# Patient Record
Sex: FEMALE | Race: WHITE | NOT HISPANIC OR LATINO
[De-identification: names, ages, dates, MRNs, and addresses within clinical notes are randomized per-mention and may not be internally consistent; named-entity substitution may affect disease eponyms.]

---
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
## 2023-01-10 ENCOUNTER — NON-APPOINTMENT (OUTPATIENT)
Age: 18
End: 2023-01-10

## 2023-01-10 ENCOUNTER — APPOINTMENT (OUTPATIENT)
Dept: DERMATOLOGY | Facility: CLINIC | Age: 18
End: 2023-01-10
Payer: COMMERCIAL

## 2023-01-10 DIAGNOSIS — L73.9 FOLLICULAR DISORDER, UNSPECIFIED: ICD-10-CM

## 2023-01-10 DIAGNOSIS — D22.9 MELANOCYTIC NEVI, UNSPECIFIED: ICD-10-CM

## 2023-01-10 PROCEDURE — 99203 OFFICE O/P NEW LOW 30 MIN: CPT

## 2023-01-10 RX ORDER — HYDROCORTISONE 25 MG/G
2.5 CREAM TOPICAL TWICE DAILY
Qty: 15 | Refills: 0 | Status: ACTIVE | COMMUNITY
Start: 2023-01-10 | End: 1900-01-01

## 2023-01-10 RX ORDER — CLINDAMYCIN PHOSPHATE 1 G/10ML
1 GEL TOPICAL TWICE DAILY
Qty: 1 | Refills: 3 | Status: ACTIVE | COMMUNITY
Start: 2023-01-10 | End: 1900-01-01

## 2023-01-10 NOTE — HISTORY OF PRESENT ILLNESS
[FreeTextEntry1] : Patient notes many moles for skin check. [de-identified] : Also with recurring irritation and scaling of the left upper eyelid, and rash of the buttocks.

## 2023-01-10 NOTE — ASSESSMENT
[FreeTextEntry1] : A complete skin examination was performed.  There is no evidence of skin cancer.  We discussed the importance of photoprotection, including the use of hats, protective clothing and sunscreens with an SPF of at least 30.  Sun avoidance was also discussed.  The ABCDE's of melanoma was discussed.  Regular skin exams recommended.\par \par Benign pigmented nevi - discussed at length.\par Annual skin exams encourage.\par \par Possible contact dermatitis - left upper eyelid.\par Possible from make-up including mascara.\par Discussed with patient.\par Hytone cream bid prn to iimprove, use only as necessary.\par If persistent or continues to recur, will consider patch testing, possibly over the summer when patient is home from school.\par \par Folliculitis of the buttocks.\par Discussed.\par Clindamycin gel bid prn.\par Dial cleanser.\par Keep region dry.\par Try witch hazel astringent daily.

## 2023-01-10 NOTE — PHYSICAL EXAM
[Alert] : alert [Oriented x 3] : ~L oriented x 3 [Well Nourished] : well nourished [Full Body Skin Exam Performed] : performed [FreeTextEntry3] : A full skin exam was performed including the scalp, face, neck, chest, abdomen, back, buttocks, upper extremities and lower extremities.  The patient declined examination of the breasts and genitalia.  \par The exam did show the following benign growths:\par Bath pigmented nevi - numerous, all small, regular and homogeneous - scalp, neck, trunk, exts.\par \par Erythematous patch with scale, left upper eyelid.\par \par Follicular erythematous papules of the buttocks.\par \par

## 2023-01-18 ENCOUNTER — APPOINTMENT (OUTPATIENT)
Dept: OBGYN | Facility: CLINIC | Age: 18
End: 2023-01-18
Payer: COMMERCIAL

## 2023-01-18 VITALS
WEIGHT: 151 LBS | SYSTOLIC BLOOD PRESSURE: 120 MMHG | BODY MASS INDEX: 27.79 KG/M2 | DIASTOLIC BLOOD PRESSURE: 80 MMHG | HEIGHT: 62 IN

## 2023-01-18 LAB — HCG UR QL: NEGATIVE

## 2023-01-18 PROCEDURE — 99213 OFFICE O/P EST LOW 20 MIN: CPT

## 2023-01-18 PROCEDURE — 81025 URINE PREGNANCY TEST: CPT

## 2023-01-18 RX ORDER — NORETHINDRONE ACETATE AND ETHINYL ESTRADIOL 1MG-20(24)
1-20 KIT ORAL
Qty: 1 | Refills: 0 | Status: DISCONTINUED | COMMUNITY
Start: 2022-06-27 | End: 2023-01-18

## 2023-01-18 NOTE — HISTORY OF PRESENT ILLNESS
[Currently Active] : currently active [Men] : men [Condoms] : Condoms [FreeTextEntry1] : 9/2022 [FreeTextEntry4] : s/p Gardasil vaccine

## 2023-01-18 NOTE — DISCUSSION/SUMMARY
[FreeTextEntry1] : 1) Rx changed to Tri-Sprintec as patient desires to have a menses\par \par rEturn to office in 6 months for surveillance

## 2023-02-03 ENCOUNTER — TRANSCRIPTION ENCOUNTER (OUTPATIENT)
Age: 18
End: 2023-02-03

## 2023-03-20 ENCOUNTER — NON-APPOINTMENT (OUTPATIENT)
Age: 18
End: 2023-03-20

## 2023-03-24 ENCOUNTER — APPOINTMENT (OUTPATIENT)
Dept: DERMATOLOGY | Facility: CLINIC | Age: 18
End: 2023-03-24
Payer: COMMERCIAL

## 2023-03-24 PROCEDURE — 95044 PATCH/APPLICATION TESTS: CPT

## 2023-03-24 NOTE — ASSESSMENT
[FreeTextEntry1] : Patient aware of patch testing protocol.\par f/u in 2-3 days for patch removal.  Keep patches dry.

## 2023-03-27 ENCOUNTER — APPOINTMENT (OUTPATIENT)
Dept: DERMATOLOGY | Facility: CLINIC | Age: 18
End: 2023-03-27
Payer: COMMERCIAL

## 2023-03-27 PROCEDURE — 99213 OFFICE O/P EST LOW 20 MIN: CPT

## 2023-03-27 NOTE — PHYSICAL EXAM
[FreeTextEntry3] : Patch Test Reading  #1\par \par Days after application:  3\par \par Total number of patch tests:  147\par \par Panel 1:  All negative\par Panel 2:  All negative\par Panel 3:  Mild positive to #21 (tocopherol)\par Panel 4:  Mild positive to #31 (quaternium-15)\par Panel 5:  Mild positive to #41 (Cinnamel)\par Panel 6:  All negative\par Panel 7:  1+ to #65 (Ethylenurea melamine formaldehyde mix)\par Panel 8:  All negative\par \par Panel 9:  All negative\par Panel 10:  All negative\par Panel 11:  All negative\par \par Panel C1:  All negative\par Panel C2:  1+ to #14 (Hydroabietyl alcohol)\par Panel C3:  All negative\par Panel C4:  1+ to #36 (Gallate mix)\par

## 2023-03-29 ENCOUNTER — APPOINTMENT (OUTPATIENT)
Dept: DERMATOLOGY | Facility: CLINIC | Age: 18
End: 2023-03-29
Payer: COMMERCIAL

## 2023-03-29 DIAGNOSIS — L23.9 ALLERGIC CONTACT DERMATITIS, UNSPECIFIED CAUSE: ICD-10-CM

## 2023-03-29 PROCEDURE — 99214 OFFICE O/P EST MOD 30 MIN: CPT

## 2023-03-29 NOTE — ASSESSMENT
[FreeTextEntry1] : Contact dermatitis, with eyelid dermatitis.\par Significant findings with fragrance (linalool and limonene), hydroabietyl alcohol, and possibly gallates.\par Discussed at great length.\par Info sheets printed and given to patient.\par She is to call if any concerns, otherwise, f/u if needed in 2-3 months.

## 2023-03-29 NOTE — PHYSICAL EXAM
[FreeTextEntry3] : Patch Test Reading  #2\par \par Days after application:  5\par \par Total number of patch tests:  147\par \par Panel 1:  All negative\par Panel 2:  All negative\par Panel 3:  All negative\par Panel 4:  All negative\par Panel 5:  1+ reaction to #48 (linalool) and #49 (limonene)\par Panel 6:  All negative\par Panel 7:  All negative\par Panel 8:  1+ to #79 (Cobalt)\par \par Panel 9:  All negative\par Panel 10:  All negative\par Panel 11:  All negative\par \par Panel C1:  All negative\par Panel C2:  1+ to #14 (hydroabietyl alcohol)\par Panel C3:  All  negative\par Panel C4:  1+ to #36 (Gallate mix)\par

## 2023-04-19 ENCOUNTER — NON-APPOINTMENT (OUTPATIENT)
Age: 18
End: 2023-04-19

## 2023-07-20 ENCOUNTER — RX RENEWAL (OUTPATIENT)
Age: 18
End: 2023-07-20

## 2023-07-25 ENCOUNTER — TRANSCRIPTION ENCOUNTER (OUTPATIENT)
Age: 18
End: 2023-07-25

## 2023-08-21 ENCOUNTER — APPOINTMENT (OUTPATIENT)
Dept: OBGYN | Facility: CLINIC | Age: 18
End: 2023-08-21

## 2023-08-23 ENCOUNTER — APPOINTMENT (OUTPATIENT)
Dept: OBGYN | Facility: CLINIC | Age: 18
End: 2023-08-23
Payer: COMMERCIAL

## 2023-08-23 VITALS
DIASTOLIC BLOOD PRESSURE: 66 MMHG | SYSTOLIC BLOOD PRESSURE: 114 MMHG | WEIGHT: 150 LBS | HEIGHT: 62 IN | BODY MASS INDEX: 27.6 KG/M2

## 2023-08-23 DIAGNOSIS — Z30.9 ENCOUNTER FOR CONTRACEPTIVE MANAGEMENT, UNSPECIFIED: ICD-10-CM

## 2023-08-23 DIAGNOSIS — Z11.3 ENCOUNTER FOR SCREENING FOR INFECTIONS WITH A PREDOMINANTLY SEXUAL MODE OF TRANSMISSION: ICD-10-CM

## 2023-08-23 DIAGNOSIS — Z01.419 ENCOUNTER FOR GYNECOLOGICAL EXAMINATION (GENERAL) (ROUTINE) W/OUT ABNORMAL FINDINGS: ICD-10-CM

## 2023-08-23 PROCEDURE — 99395 PREV VISIT EST AGE 18-39: CPT

## 2023-08-23 RX ORDER — FLUOXETINE HYDROCHLORIDE 20 MG/1
20 CAPSULE ORAL
Refills: 0 | Status: DISCONTINUED | COMMUNITY
End: 2023-08-23

## 2023-08-23 RX ORDER — NORGESTIMATE AND ETHINYL ESTRADIOL 7DAYSX3 28
0.18/0.215/0.25 KIT ORAL DAILY
Qty: 3 | Refills: 1 | Status: DISCONTINUED | COMMUNITY
Start: 2023-01-18 | End: 2023-08-23

## 2023-08-23 NOTE — HISTORY OF PRESENT ILLNESS
[TextBox_4] : Alis is a 19 y/o G0 who presents today for an annual exam with OCP refill. She denies any cotnraindications for continued usage and reports daily/timely OCP compliance.   She was switched from Junel to Tri-Sprintec due to concerns of no menses with her previous pilll.  Today she reports regular menses however reports increased mood lability/irritability.  She atttens [employer]. She was working at the [employer] this summer [FreeTextEntry1] : 8/23/23 [Currently Active] : currently active

## 2023-08-23 NOTE — PHYSICAL EXAM
[Appropriately responsive] : appropriately responsive [Alert] : alert [No Acute Distress] : no acute distress [No Lymphadenopathy] : no lymphadenopathy [Oriented x3] : oriented x3 [Examination Of The Breasts] : a normal appearance [No Discharge] : no discharge [No Masses] : no breast masses were palpable [Labia Majora] : normal [Labia Minora] : normal [Scant] : There was scant vaginal bleeding [Normal] : normal [Uterine Adnexae] : normal

## 2023-09-19 ENCOUNTER — TRANSCRIPTION ENCOUNTER (OUTPATIENT)
Age: 18
End: 2023-09-19

## 2023-09-21 ENCOUNTER — TRANSCRIPTION ENCOUNTER (OUTPATIENT)
Age: 18
End: 2023-09-21

## 2023-09-21 RX ORDER — LEVONORGESTREL AND ETHINYL ESTRADIOL 0.1-0.02MG
0.1-2 KIT ORAL DAILY
Qty: 3 | Refills: 3 | Status: ACTIVE | COMMUNITY
Start: 2023-09-19 | End: 1900-01-01

## 2024-01-03 ENCOUNTER — APPOINTMENT (OUTPATIENT)
Dept: OBGYN | Facility: CLINIC | Age: 19
End: 2024-01-03

## 2024-08-26 ENCOUNTER — APPOINTMENT (OUTPATIENT)
Dept: OBGYN | Facility: CLINIC | Age: 19
End: 2024-08-26

## 2024-11-26 ENCOUNTER — APPOINTMENT (OUTPATIENT)
Dept: DERMATOLOGY | Facility: CLINIC | Age: 19
End: 2024-11-26
Payer: COMMERCIAL

## 2024-11-26 VITALS — BODY MASS INDEX: 26.68 KG/M2 | WEIGHT: 145 LBS | HEIGHT: 62 IN

## 2024-11-26 DIAGNOSIS — L74.510 PRIMARY FOCAL HYPERHIDROSIS, AXILLA: ICD-10-CM

## 2024-11-26 PROCEDURE — 99214 OFFICE O/P EST MOD 30 MIN: CPT

## 2024-11-26 RX ORDER — SERTRALINE HYDROCHLORIDE 50 MG/1
50 TABLET, FILM COATED ORAL
Refills: 0 | Status: ACTIVE | COMMUNITY

## 2024-12-02 RX ORDER — ONABOTULINUMTOXINA 100 [USP'U]/1
100 INJECTION, POWDER, LYOPHILIZED, FOR SOLUTION INTRADERMAL; INTRAMUSCULAR
Qty: 1 | Refills: 2 | Status: ACTIVE | COMMUNITY
Start: 2024-11-26

## 2025-01-02 ENCOUNTER — APPOINTMENT (OUTPATIENT)
Dept: DERMATOLOGY | Facility: CLINIC | Age: 20
End: 2025-01-02
Payer: COMMERCIAL

## 2025-01-02 DIAGNOSIS — L74.510 PRIMARY FOCAL HYPERHIDROSIS, AXILLA: ICD-10-CM

## 2025-01-02 PROCEDURE — 64650 CHEMODENERV ECCRINE GLANDS: CPT

## 2025-03-31 ENCOUNTER — TRANSCRIPTION ENCOUNTER (OUTPATIENT)
Age: 20
End: 2025-03-31

## 2025-06-09 ENCOUNTER — APPOINTMENT (OUTPATIENT)
Dept: OBGYN | Facility: CLINIC | Age: 20
End: 2025-06-09
Payer: COMMERCIAL

## 2025-06-09 VITALS
HEIGHT: 62 IN | SYSTOLIC BLOOD PRESSURE: 110 MMHG | DIASTOLIC BLOOD PRESSURE: 70 MMHG | WEIGHT: 149 LBS | BODY MASS INDEX: 27.42 KG/M2

## 2025-06-09 PROCEDURE — 99395 PREV VISIT EST AGE 18-39: CPT

## 2025-06-09 RX ORDER — SERTRALINE HYDROCHLORIDE 100 MG/1
100 TABLET, FILM COATED ORAL
Refills: 0 | Status: ACTIVE | COMMUNITY

## 2025-06-10 ENCOUNTER — APPOINTMENT (OUTPATIENT)
Dept: DERMATOLOGY | Facility: CLINIC | Age: 20
End: 2025-06-10
Payer: COMMERCIAL

## 2025-06-10 LAB
C TRACH RRNA SPEC QL NAA+PROBE: NOT DETECTED
N GONORRHOEA RRNA SPEC QL NAA+PROBE: NOT DETECTED
SOURCE TP AMPLIFICATION: NORMAL

## 2025-06-10 PROCEDURE — 64650 CHEMODENERV ECCRINE GLANDS: CPT

## 2025-06-10 PROCEDURE — 99214 OFFICE O/P EST MOD 30 MIN: CPT | Mod: 25

## 2025-06-10 RX ORDER — ALUMINUM CHLORIDE 20 %
20 SOLUTION, NON-ORAL TOPICAL
Qty: 1 | Refills: 5 | Status: ACTIVE | COMMUNITY
Start: 2025-06-10 | End: 1900-01-01

## 2025-06-11 LAB — CYTOLOGY CVX/VAG DOC THIN PREP: NORMAL
